# Patient Record
Sex: FEMALE | Race: WHITE | URBAN - METROPOLITAN AREA
[De-identification: names, ages, dates, MRNs, and addresses within clinical notes are randomized per-mention and may not be internally consistent; named-entity substitution may affect disease eponyms.]

---

## 2023-01-25 ENCOUNTER — OFFICE (OUTPATIENT)
Dept: URBAN - METROPOLITAN AREA CLINIC 116 | Facility: CLINIC | Age: 23
Setting detail: OPHTHALMOLOGY
End: 2023-01-25
Payer: COMMERCIAL

## 2023-01-25 DIAGNOSIS — H01.002: ICD-10-CM

## 2023-01-25 DIAGNOSIS — H01.005: ICD-10-CM

## 2023-01-25 PROCEDURE — 92014 COMPRE OPH EXAM EST PT 1/>: CPT | Performed by: OPTOMETRIST

## 2023-01-25 ASSESSMENT — AXIALLENGTH_DERIVED
OS_AL: 25.3534
OD_AL: 24.2542
OD_AL: 24.2542
OS_AL: 25.5232
OS_AL: 25.3534
OD_AL: 24.2029

## 2023-01-25 ASSESSMENT — CONFRONTATIONAL VISUAL FIELD TEST (CVF)
OD_FINDINGS: FULL
OS_FINDINGS: FULL

## 2023-01-25 ASSESSMENT — SPHEQUIV_DERIVED
OD_SPHEQUIV: -2.125
OS_SPHEQUIV: -3.5
OD_SPHEQUIV: -2.125
OS_SPHEQUIV: -3.875
OS_SPHEQUIV: -3.5
OD_SPHEQUIV: -2

## 2023-01-25 ASSESSMENT — REFRACTION_MANIFEST
OS_AXIS: 168
OS_VA1: 20/20
OD_CYLINDER: -1.75
OD_AXIS: 002
OD_SPHERE: -1.25
OS_SPHERE: -3.00
OD_CYLINDER: -2.25
OS_VA1: 20/20
OD_VA1: 20/20
OS_CYLINDER: -1.75
OS_CYLINDER: -1.50
OS_SPHERE: -2.75
OS_AXIS: 175
OD_AXIS: 005
OD_SPHERE: -1.00
OD_VA1: 20/25

## 2023-01-25 ASSESSMENT — KERATOMETRY
OD_K2POWER_DIOPTERS: 45.75
OS_K1POWER_DIOPTERS: 41.00
OD_AXISANGLE_DEGREES: 030
OD_K1POWER_DIOPTERS: 42.25
OS_K2POWER_DIOPTERS: 44.50
OS_AXISANGLE_DEGREES: 060

## 2023-01-25 ASSESSMENT — REFRACTION_AUTOREFRACTION
OS_CYLINDER: -2.00
OS_AXIS: 175
OD_SPHERE: -0.50
OS_SPHERE: -2.50
OD_AXIS: 010
OD_CYLINDER: -3.00

## 2023-01-25 ASSESSMENT — LID EXAM ASSESSMENTS
OS_BLEPHARITIS: LLL 1+
OD_BLEPHARITIS: RLL 1+

## 2023-01-25 ASSESSMENT — TONOMETRY: OS_IOP_MMHG: 21

## 2023-01-25 ASSESSMENT — VISUAL ACUITY
OS_BCVA: 20/40
OD_BCVA: 20/60

## 2023-03-16 ENCOUNTER — OFFICE (OUTPATIENT)
Dept: URBAN - METROPOLITAN AREA CLINIC 94 | Facility: CLINIC | Age: 23
Setting detail: OPHTHALMOLOGY
End: 2023-03-16
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
## 2023-03-16 PROCEDURE — NO SHOW FE NO SHOW FEE: Performed by: OPTOMETRIST

## 2023-11-09 ENCOUNTER — APPOINTMENT (OUTPATIENT)
Dept: PULMONOLOGY | Facility: CLINIC | Age: 23
End: 2023-11-09
Payer: COMMERCIAL

## 2023-11-09 VITALS
RESPIRATION RATE: 16 BRPM | BODY MASS INDEX: 36.07 KG/M2 | OXYGEN SATURATION: 97 % | DIASTOLIC BLOOD PRESSURE: 64 MMHG | HEIGHT: 62 IN | WEIGHT: 196 LBS | HEART RATE: 74 BPM | SYSTOLIC BLOOD PRESSURE: 102 MMHG

## 2023-11-09 PROBLEM — Z00.00 ENCOUNTER FOR PREVENTIVE HEALTH EXAMINATION: Status: ACTIVE | Noted: 2023-11-09

## 2023-11-09 PROCEDURE — 94010 BREATHING CAPACITY TEST: CPT

## 2023-11-09 PROCEDURE — 99205 OFFICE O/P NEW HI 60 MIN: CPT | Mod: 25

## 2023-11-09 PROCEDURE — 95012 NITRIC OXIDE EXP GAS DETER: CPT

## 2023-11-09 RX ORDER — PREDNISONE 10 MG/1
10 TABLET ORAL
Qty: 25 | Refills: 0 | Status: ACTIVE | COMMUNITY
Start: 2023-11-09 | End: 1900-01-01

## 2023-11-09 RX ORDER — BUDESONIDE 180 UG/1
180 AEROSOL, POWDER RESPIRATORY (INHALATION)
Qty: 3 | Refills: 3 | Status: ACTIVE | COMMUNITY
Start: 2023-11-09 | End: 1900-01-01

## 2023-11-09 RX ORDER — PREDNISONE 10 MG
TABLET ORAL
Refills: 0 | Status: ACTIVE | COMMUNITY

## 2023-11-12 ENCOUNTER — RESULT CHARGE (OUTPATIENT)
Age: 23
End: 2023-11-12

## 2023-11-13 ENCOUNTER — APPOINTMENT (OUTPATIENT)
Dept: PULMONOLOGY | Facility: CLINIC | Age: 23
End: 2023-11-13
Payer: COMMERCIAL

## 2023-11-13 VITALS — HEIGHT: 63 IN | WEIGHT: 192 LBS | BODY MASS INDEX: 34.02 KG/M2

## 2023-11-13 VITALS
HEART RATE: 94 BPM | SYSTOLIC BLOOD PRESSURE: 116 MMHG | RESPIRATION RATE: 16 BRPM | DIASTOLIC BLOOD PRESSURE: 62 MMHG | OXYGEN SATURATION: 98 %

## 2023-11-13 DIAGNOSIS — Z3A.19 19 WEEKS GESTATION OF PREGNANCY: ICD-10-CM

## 2023-11-13 DIAGNOSIS — D72.19 OTHER EOSINOPHILIA: ICD-10-CM

## 2023-11-13 DIAGNOSIS — J45.901 UNSPECIFIED ASTHMA WITH (ACUTE) EXACERBATION: ICD-10-CM

## 2023-11-13 PROCEDURE — 94010 BREATHING CAPACITY TEST: CPT

## 2023-11-13 PROCEDURE — 99215 OFFICE O/P EST HI 40 MIN: CPT | Mod: 25

## 2023-11-17 ENCOUNTER — OFFICE (OUTPATIENT)
Dept: URBAN - METROPOLITAN AREA CLINIC 104 | Facility: CLINIC | Age: 23
Setting detail: OPHTHALMOLOGY
End: 2023-11-17
Payer: COMMERCIAL

## 2023-11-17 DIAGNOSIS — H01.005: ICD-10-CM

## 2023-11-17 DIAGNOSIS — H52.13: ICD-10-CM

## 2023-11-17 DIAGNOSIS — H01.002: ICD-10-CM

## 2023-11-17 PROCEDURE — 92014 COMPRE OPH EXAM EST PT 1/>: CPT | Performed by: OPTOMETRIST

## 2023-11-17 PROCEDURE — 92015 DETERMINE REFRACTIVE STATE: CPT | Performed by: OPTOMETRIST

## 2023-11-17 ASSESSMENT — REFRACTION_CURRENTRX
OS_AXIS: 163
OD_CYLINDER: -1.50
OD_SPHERE: -1.50
OD_AXIS: 001
OS_SPHERE: -2.75
OD_OVR_VA: 20/
OS_OVR_VA: 20/
OS_CYLINDER: -1.50

## 2023-11-17 ASSESSMENT — CONFRONTATIONAL VISUAL FIELD TEST (CVF)
OS_FINDINGS: FULL
OD_FINDINGS: FULL

## 2023-11-17 ASSESSMENT — REFRACTION_MANIFEST
OS_AXIS: 175
OS_SPHERE: -2.75
OD_AXIS: 005
OD_SPHERE: -1.25
OD_VA1: 20/20
OS_CYLINDER: -1.75
OD_CYLINDER: -2.25
OS_VA1: 20/20

## 2023-11-17 ASSESSMENT — REFRACTION_AUTOREFRACTION
OD_CYLINDER: -2.75
OD_SPHERE: -0.75
OD_AXIS: 003
OS_AXIS: 168
OS_CYLINDER: -1.75
OS_SPHERE: -2.50

## 2023-11-17 ASSESSMENT — SPHEQUIV_DERIVED
OS_SPHEQUIV: -3.375
OD_SPHEQUIV: -2.125
OD_SPHEQUIV: -2.375
OS_SPHEQUIV: -3.625

## 2023-11-17 ASSESSMENT — LID EXAM ASSESSMENTS
OS_BLEPHARITIS: LLL 1+
OD_BLEPHARITIS: RLL 1+

## 2023-12-02 ENCOUNTER — NON-APPOINTMENT (OUTPATIENT)
Age: 23
End: 2023-12-02

## 2023-12-20 ENCOUNTER — APPOINTMENT (OUTPATIENT)
Dept: PULMONOLOGY | Facility: CLINIC | Age: 23
End: 2023-12-20
Payer: COMMERCIAL

## 2023-12-20 VITALS
WEIGHT: 201 LBS | HEIGHT: 62 IN | SYSTOLIC BLOOD PRESSURE: 124 MMHG | HEART RATE: 84 BPM | BODY MASS INDEX: 36.99 KG/M2 | RESPIRATION RATE: 16 BRPM | OXYGEN SATURATION: 97 % | DIASTOLIC BLOOD PRESSURE: 64 MMHG

## 2023-12-20 DIAGNOSIS — Z3A.25 25 WEEKS GESTATION OF PREGNANCY: ICD-10-CM

## 2023-12-20 DIAGNOSIS — R05.9 COUGH, UNSPECIFIED: ICD-10-CM

## 2023-12-20 DIAGNOSIS — R06.2 WHEEZING: ICD-10-CM

## 2023-12-20 DIAGNOSIS — J45.50 SEVERE PERSISTENT ASTHMA, UNCOMPLICATED: ICD-10-CM

## 2023-12-20 DIAGNOSIS — R06.02 SHORTNESS OF BREATH: ICD-10-CM

## 2023-12-20 PROCEDURE — 99215 OFFICE O/P EST HI 40 MIN: CPT | Mod: 25

## 2023-12-20 PROCEDURE — 95012 NITRIC OXIDE EXP GAS DETER: CPT

## 2023-12-20 RX ORDER — ALBUTEROL SULFATE 90 UG/1
108 (90 BASE) INHALANT RESPIRATORY (INHALATION)
Qty: 3 | Refills: 3 | Status: ACTIVE | COMMUNITY
Start: 2023-12-20 | End: 1900-01-01

## 2023-12-20 RX ORDER — ALBUTEROL SULFATE 2.5 MG/3ML
(2.5 MG/3ML) SOLUTION RESPIRATORY (INHALATION)
Qty: 3 | Refills: 3 | Status: ACTIVE | COMMUNITY
Start: 1900-01-01 | End: 1900-01-01

## 2023-12-20 NOTE — HISTORY OF PRESENT ILLNESS
[Never] : never [TextBox_4] : Began with wheeze and cough in Oct 2023. Last 1-2 weeks increased symptoms. Particularly bad 6 days ago. SOB with heavy mvmt.   Went to [facility] ER 11/5/23. Given nebs ATC, PO prednisone. At the hospital, the nebs helped.   When came for consult, still on prednisone. Still with symptoms - sob, wheeze, cough. Symptoms worse at night. Wheeze on exam. Franki with obstruction.  Given higher dose of prednisone and Rx for pulmicort.   Since last visit here in Nov 2023, 2 course of prednisone again due to wheeze.   Franki today is normal.   Off prednisone for about one week. Taking pulmicort BID. Albuterol prn helps.    Previously had issues with wheeze when around dogs. WHen in pet store in August, started wheezing; was pregnant at the time.   Pain in her back when coughing; area tender.   Currently 25 wks pregnant. Second pregnancy; no issues with first one. LMP 6/15/23. Getting regular OB f/u.   She did buy a dog; has a ramila. No other pets.  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

## 2023-12-20 NOTE — PLAN
[TextEntry] : Would like to avoid so much steroids. Try switch pulmicort to symbicort. Rinse mouth after use. Albuterol prn. Get labwork now. If any worsening or no improvement then TO ER ASAP.  OB f/u as well. Once not longer pregnant, if asthma still difficult, can consider immunologic therapy. May need allergy eval. Further reccs will come.

## 2023-12-20 NOTE — ASSESSMENT
[FreeTextEntry1] : Asthma with bronchospasm. Needing multiple doses of steroids despite ICS. Franki now normal (severe obstruction on PFT 11/9/23) but FENO very elevated. ?atopy in addition. Has not been able to get labwork. No distress now. O2 sat WNL.

## 2024-01-28 ENCOUNTER — NON-APPOINTMENT (OUTPATIENT)
Age: 24
End: 2024-01-28

## 2024-02-08 ENCOUNTER — APPOINTMENT (OUTPATIENT)
Dept: PULMONOLOGY | Facility: CLINIC | Age: 24
End: 2024-02-08

## 2024-04-01 ENCOUNTER — RX CHANGE (OUTPATIENT)
Age: 24
End: 2024-04-01

## 2024-04-01 RX ORDER — FLUTICASONE FUROATE AND VILANTEROL TRIFENATATE 100; 25 UG/1; UG/1
100-25 POWDER RESPIRATORY (INHALATION) DAILY
Qty: 3 | Refills: 3 | Status: ACTIVE | COMMUNITY
Start: 1900-01-01 | End: 1900-01-01

## 2024-04-01 RX ORDER — BUDESONIDE AND FORMOTEROL FUMARATE DIHYDRATE 160; 4.5 UG/1; UG/1
160-4.5 AEROSOL RESPIRATORY (INHALATION) TWICE DAILY
Qty: 3 | Refills: 3 | Status: DISCONTINUED | COMMUNITY
Start: 2023-12-20 | End: 2024-04-01

## 2024-05-21 ENCOUNTER — NON-APPOINTMENT (OUTPATIENT)
Age: 24
End: 2024-05-21
